# Patient Record
Sex: MALE | Race: WHITE | NOT HISPANIC OR LATINO | Employment: OTHER | URBAN - METROPOLITAN AREA
[De-identification: names, ages, dates, MRNs, and addresses within clinical notes are randomized per-mention and may not be internally consistent; named-entity substitution may affect disease eponyms.]

---

## 2017-11-16 DIAGNOSIS — I50.9 CONGESTIVE HEART FAILURE, UNSPECIFIED CONGESTIVE HEART FAILURE CHRONICITY, UNSPECIFIED CONGESTIVE HEART FAILURE TYPE: Primary | ICD-10-CM

## 2017-11-21 ENCOUNTER — OFFICE VISIT (OUTPATIENT)
Dept: CARDIOLOGY | Facility: CLINIC | Age: 37
End: 2017-11-21
Payer: MEDICAID

## 2017-11-21 ENCOUNTER — HOSPITAL ENCOUNTER (OUTPATIENT)
Dept: CARDIOLOGY | Facility: CLINIC | Age: 37
Discharge: HOME | End: 2017-11-21
Payer: MEDICAID

## 2017-11-21 VITALS
DIASTOLIC BLOOD PRESSURE: 76 MMHG | WEIGHT: 231.69 LBS | HEIGHT: 67 IN | SYSTOLIC BLOOD PRESSURE: 115 MMHG | HEART RATE: 54 BPM | BODY MASS INDEX: 36.36 KG/M2

## 2017-11-21 DIAGNOSIS — I50.9 CONGESTIVE HEART FAILURE, UNSPECIFIED CONGESTIVE HEART FAILURE CHRONICITY, UNSPECIFIED CONGESTIVE HEART FAILURE TYPE: ICD-10-CM

## 2017-11-21 DIAGNOSIS — I10 ESSENTIAL HYPERTENSION: ICD-10-CM

## 2017-11-21 DIAGNOSIS — I50.20 SYSTOLIC HEART FAILURE, UNSPECIFIED HEART FAILURE CHRONICITY: ICD-10-CM

## 2017-11-21 PROCEDURE — 99999 PR PBB SHADOW E&M-EST. PATIENT-LVL III: CPT | Mod: PBBFAC,,, | Performed by: INTERNAL MEDICINE

## 2017-11-21 PROCEDURE — 93010 ELECTROCARDIOGRAM REPORT: CPT | Mod: S$PBB,,, | Performed by: INTERNAL MEDICINE

## 2017-11-21 PROCEDURE — 99213 OFFICE O/P EST LOW 20 MIN: CPT | Mod: PBBFAC | Performed by: INTERNAL MEDICINE

## 2017-11-21 PROCEDURE — 99204 OFFICE O/P NEW MOD 45 MIN: CPT | Mod: S$PBB,,, | Performed by: INTERNAL MEDICINE

## 2017-11-21 PROCEDURE — 93005 ELECTROCARDIOGRAM TRACING: CPT | Mod: PBBFAC | Performed by: INTERNAL MEDICINE

## 2017-11-21 RX ORDER — CARVEDILOL 12.5 MG/1
12.5 TABLET ORAL DAILY
COMMUNITY
Start: 2016-11-22

## 2017-11-21 RX ORDER — PANTOPRAZOLE SODIUM 40 MG/1
40 TABLET, DELAYED RELEASE ORAL DAILY
COMMUNITY

## 2017-11-21 RX ORDER — LISINOPRIL AND HYDROCHLOROTHIAZIDE 12.5; 2 MG/1; MG/1
2 TABLET ORAL DAILY
COMMUNITY
Start: 2016-05-26

## 2017-11-21 RX ORDER — BUPROPION HYDROCHLORIDE 300 MG/1
300 TABLET ORAL DAILY
COMMUNITY

## 2017-11-21 NOTE — LETTER
November 21, 2017      Siri Simmons MD  [address] [facility] [place] [address]           [facility]  [address]  [place] [address]  Phone: [phone]          Patient: Sanjuanita Nuñez   MR Number: [id number]   YOB: 1980   Date of Visit: 11/21/2017       Dear Dr. Siri Simmons:    Thank you for referring Sanjuanita Nuñez to me for evaluation. Attached you will find relevant portions of my assessment and plan of care.    If you have questions, please do not hesitate to call me. I look forward to following Sanjuanita Nuñez along with you.    Sincerely,    Gianluca Gonzalez MD    Enclosure  CC:  No Recipients    If you would like to receive this communication electronically, please contact [email or web address] or [phone] to request more information on [employer] Link access.    For providers and/or their staff who would like to refer a patient to [facility], please contact us through our one-stop-shop provider referral line, [facility] , at [phone].    If you feel you have received this communication in error or would no longer like to receive these types of communications, please e-mail [email or web address]

## 2017-11-21 NOTE — ASSESSMENT & PLAN NOTE
Unknown how well controlled his BP is at home    Home BP monitoring as instructed (tid for 5 days) and bring record at the time of next appointment

## 2017-11-21 NOTE — PROGRESS NOTES
Patient ID:  Sanjuanita Nuñez is a 37 y.o. male who presents for evaluation of cardiomyopathy.    Pt is a ,  presenty on disability. He refers that in January 2016 he experienced exertional dyspnea and was diagnosed with cardiomyopathy. Coronary angiography showed normal coronaries. No cause for the cardiomyopathy has been identified. His symptoms have worsened significantly since January 2016: presently he gets short of breath by walking on a flat surface for a few hundred yards, e.g. from the garage to our Cardiology Clinic. He denies orthopnea, PND and leg edema. No symptoms suggestive of arrhythmias. No chest pain. His BP at home varies significantly ranging from 150/100 to 110/60 mmHg.    It is noteworthy that he was diagnosed with hypertension 4-5 year ago. His cholesterol was not found to be elevated. There is no family hx of heart disease. He has three daughters in good health. He has had kidney stones.  It is noteworthy that he drinks 16-18 bears during the weekend.      Present medical therapy:  Carvedilol 12.5 mg qd  Lisinopril-HCTZ 40-25 mg qd      No results found for: NA, K, CL, CO2, BUN, CREATININE, GLU, HGBA1C, MG, AST, ALT, ALBUMIN, PROT, BILITOT, WBC, HGB, HCT, MCV, PLT, INR, PSA, TSH      No results found for: CHOL, HDL, TRIG    No results found for: LDLCALC    No past medical history on file.        Review of Systems   Constitution: Negative for decreased appetite, diaphoresis, fever, weakness, malaise/fatigue, weight gain and weight loss.   HENT: Negative for congestion, ear discharge, ear pain and nosebleeds.    Eyes: Negative for blurred vision, double vision and visual disturbance.   Cardiovascular: Positive for dyspnea on exertion. Negative for chest pain, claudication, cyanosis, irregular heartbeat, leg swelling, near-syncope, orthopnea, palpitations, paroxysmal nocturnal dyspnea and syncope.   Respiratory: Negative for cough, hemoptysis, shortness of breath, sleep disturbances  "due to breathing, snoring, sputum production and wheezing.    Endocrine: Negative for polydipsia, polyphagia and polyuria.   Hematologic/Lymphatic: Negative for adenopathy and bleeding problem. Does not bruise/bleed easily.   Skin: Negative for color change, nail changes, poor wound healing and rash.   Musculoskeletal: Negative for muscle cramps and muscle weakness.   Gastrointestinal: Negative for abdominal pain, anorexia, change in bowel habit, hematochezia, nausea and vomiting.   Genitourinary: Negative for dysuria, frequency and hematuria.   Neurological: Negative for brief paralysis, difficulty with concentration, excessive daytime sleepiness, dizziness, focal weakness, headaches, light-headedness, seizures and vertigo.   Psychiatric/Behavioral: Negative for altered mental status and depression.   Allergic/Immunologic: Negative for persistent infections.                Objective:         /76 (BP Location: Left arm, Patient Position: Sitting, BP Method: X-Large (Automatic))   Pulse (!) 54   Ht 5' 7" (1.702 m)   Wt 105.1 kg (231 lb 11.3 oz)   BMI 36.29 kg/m²    Physical Exam   Constitutional: He is oriented to person, place, and time. He appears well-developed and well-nourished.   HENT:   Head: Normocephalic.   Right Ear: External ear normal.   Left Ear: External ear normal.   Nose: Nose normal.   Inspection of lips, teeth and gums normal   Eyes: EOM are normal. Pupils are equal, round, and reactive to light. No scleral icterus.   Neck: Normal range of motion. Neck supple. No JVD present. No tracheal deviation present. No thyromegaly present.   Cardiovascular: Normal rate, regular rhythm and intact distal pulses.  Exam reveals no gallop and no friction rub.    No murmur heard.  Pulses:       Carotid pulses are 2+ on the right side, and 2+ on the left side.       Radial pulses are 2+ on the right side, and 2+ on the left side.        Popliteal pulses are 2+ on the right side, and 2+ on the left side.    "     Dorsalis pedis pulses are 2+ on the right side, and 2+ on the left side.        Posterior tibial pulses are 2+ on the right side, and 2+ on the left side.   Pulmonary/Chest: Effort normal and breath sounds normal.   Abdominal: Bowel sounds are normal. He exhibits no distension. There is no hepatosplenomegaly. There is no tenderness. There is no guarding.   Musculoskeletal: Normal range of motion. He exhibits no edema or tenderness.   Lymphadenopathy:   Palpation of neck and groin lymph nodes normal   Neurological: He is alert and oriented to person, place, and time. No cranial nerve deficit. He exhibits normal muscle tone. Coordination normal.   Skin: Skin is dry.   No ankle nor pretibial edema   Psychiatric: His behavior is normal. Judgment and thought content normal.           ECG (11/21/2017 - informal reading)  Sinus bradycardia at 54/min  Non-specific ST-T wave changes      I have reviewed the following:     Details / Date    []   Labs     []   Imaging     []   Cardiology Procedures     []   Other      Assessment and Plan:       1. Systolic heart failure, unspecified heart failure chronicity    2. Essential hypertension         HFrEF (heart failure with reduced ejection fraction)  Non-ischemic cardiomyopathy of unknown etiology.    Echocardiogram  BNP  CMP  CBC  Lipid panel   RTC in 2 weeks    Essential hypertension  Unknown how well controlled his BP is at home    Home BP monitoring as instructed (tid for 5 days) and bring record at the time of next appointment

## 2017-11-21 NOTE — ASSESSMENT & PLAN NOTE
Non-ischemic cardiomyopathy of unknown etiology.    Echocardiogram  BNP  CMP  CBC  Lipid panel   RTC in 2 weeks

## 2018-01-09 ENCOUNTER — PATIENT MESSAGE (OUTPATIENT)
Dept: CARDIOLOGY | Facility: CLINIC | Age: 38
End: 2018-01-09

## 2018-01-18 NOTE — PROGRESS NOTES
" Patient ID:  Sanjuanita Nuñez is a 37 y.o. male who presents for follow-up of cardiomyopathy.     Pt refers that since last visit with me on 11/21/2017 his exercise tolerance has not improved and he still gets short of breath with minimal exercise; no difference.    Further, he complains of episodes of chest pain that have been present since January 2016. These episodes (not mentioned at the time of last visit) occur 3-10 times per week and their frequency has somewhat increased since they started. The pain is retrosternal and occasionally radiates to the left jaw; he describes the pain as "pressure" or "sharp", intensity 6-7/10. The chest pain can occur when he performs physical activity, although not reproducibly, and also at rest. The chest pain is associated with shortness of breath and nausea.    He still drinks 10 beers during the weekend (although I advised him to quit drinking). Usually he does not drink during the week.    In the past he has been treated with atorvastatin but has developed muscle cramps in response to this drug and discontinued it.    It is noteworthy that he was diagnosed with hypertension 4-5 year ago. His cholesterol was not found to be elevated. There is no family hx of heart disease. He has three daughters in good health. He has had kidney stones. It is noteworthy that he drinks 16-18 bears during the weekend.       Present medical therapy:  Carvedilol 12.5 mg qd  Lisinopril-HCTZ 40-25 mg qd    BNP is normal          Relevant information from my 11/21/2017 note     'Pt is a ,  presenty on disability. He refers that in January 2016 he experienced exertional dyspnea and was diagnosed with cardiomyopathy. Coronary angiography showed normal coronaries. No cause for the cardiomyopathy has been identified. His symptoms have worsened significantly since January 2016: presently he gets short of breath by walking on a flat surface for a few hundred yards, e.g. from the garage to our " "Cardiology Clinic. He denies orthopnea, PND and leg edema. No symptoms suggestive of arrhythmias. No chest pain. His BP at home varies significantly ranging from 150/100 to 110/60 mmHg".         Lab Results   Component Value Date     01/19/2018    K 3.8 01/19/2018     01/19/2018    CO2 30 (H) 01/19/2018    BUN 13 01/19/2018    CREATININE 1.1 01/19/2018     01/19/2018    AST 23 01/19/2018    ALT 58 (H) 01/19/2018    ALBUMIN 3.6 01/19/2018    PROT 7.1 01/19/2018    BILITOT 0.4 01/19/2018    WBC 7.30 01/19/2018    HGB 16.8 01/19/2018    HCT 48.2 01/19/2018    MCV 88 01/19/2018     01/19/2018       History reviewed. No pertinent past medical history.  Family History   Problem Relation Age of Onset    Heart attack Maternal Grandfather     Heart attack Paternal Grandfather     Hypertension Neg Hx     Hyperlipidemia Neg Hx      Social History     Social History    Marital status:      Spouse name: N/A    Number of children: N/A    Years of education: N/A     Occupational History    Not on file.     Social History Main Topics    Smoking status: Never Smoker    Smokeless tobacco: Never Used    Alcohol use Yes      Comment: socially    Drug use: Unknown    Sexual activity: Not on file     Other Topics Concern    Not on file     Social History Narrative    No narrative on file       Lab Results   Component Value Date    CHOL 255 (H) 01/19/2018    HDL 30 (L) 01/19/2018    TRIG 202 (H) 01/19/2018       Lab Results   Component Value Date    LDLCALC 184.6 (H) 01/19/2018       History reviewed. No pertinent past medical history.  Hypertension Medications             carvedilol (COREG) 12.5 MG tablet Take 12.5 mg by mouth once daily.     lisinopril-hydrochlorothiazide (PRINZIDE,ZESTORETIC) 20-12.5 mg per tablet Take 2 tablets by mouth once daily.             Review of Systems   Constitution: Negative for decreased appetite, diaphoresis, fever, weakness, malaise/fatigue, weight gain and " "weight loss.   HENT: Negative for congestion, ear discharge, ear pain and nosebleeds.    Eyes: Negative for blurred vision, double vision and visual disturbance.   Cardiovascular: Positive for dyspnea on exertion. Negative for chest pain, claudication, cyanosis, irregular heartbeat, leg swelling, near-syncope, orthopnea, palpitations, paroxysmal nocturnal dyspnea and syncope.   Respiratory: Negative for cough, hemoptysis, shortness of breath, sleep disturbances due to breathing, snoring, sputum production and wheezing.    Endocrine: Negative for polydipsia, polyphagia and polyuria.   Hematologic/Lymphatic: Negative for adenopathy and bleeding problem. Does not bruise/bleed easily.   Skin: Negative for color change, nail changes, poor wound healing and rash.   Musculoskeletal: Negative for muscle cramps and muscle weakness.   Gastrointestinal: Negative for abdominal pain, anorexia, change in bowel habit, hematochezia, nausea and vomiting.   Genitourinary: Negative for dysuria, frequency and hematuria.   Neurological: Negative for brief paralysis, difficulty with concentration, excessive daytime sleepiness, dizziness, focal weakness, headaches, light-headedness, seizures and vertigo.   Psychiatric/Behavioral: Negative for altered mental status and depression.   Allergic/Immunologic: Negative for persistent infections.                Objective:         /76 (BP Location: Left arm, Patient Position: Sitting, BP Method: Large (Automatic))   Pulse 92   Ht 5' 7" (1.702 m)   Wt 108.3 kg (238 lb 12.1 oz)   SpO2 96%   BMI 37.39 kg/m²    Physical Exam   Constitutional: He is oriented to person, place, and time. He appears well-developed and well-nourished.   HENT:   Head: Normocephalic.   Right Ear: External ear normal.   Left Ear: External ear normal.   Nose: Nose normal.   Inspection of lips, teeth and gums normal   Eyes: EOM are normal. Pupils are equal, round, and reactive to light. No scleral icterus.   Neck: " Normal range of motion. Neck supple. No JVD present. No tracheal deviation present. No thyromegaly present.   Cardiovascular: Normal rate, regular rhythm, S1 normal, S2 normal and intact distal pulses.  Exam reveals no gallop and no friction rub.    No murmur heard.  Pulmonary/Chest: Effort normal and breath sounds normal.   Abdominal: Bowel sounds are normal. He exhibits no distension. There is no hepatosplenomegaly. There is no tenderness. There is no guarding.   Musculoskeletal: Normal range of motion. He exhibits no edema or tenderness.   Lymphadenopathy:   Palpation of neck and groin lymph nodes normal   Neurological: He is alert and oriented to person, place, and time. No cranial nerve deficit. He exhibits normal muscle tone. Coordination normal.   Skin: Skin is dry.   Palpation of skin normal   Psychiatric: His behavior is normal. Judgment and thought content normal.             ECG (11/21/2017 - informal reading)  Sinus bradycardia at 54/min  Non-specific ST-T wave changes          Echocardiogram (01/19/2018)    TEST DESCRIPTION   Technical Quality: This is a technically adequate study. This study was performed in conjunction with a 3ml intravenous injection of Optison contrast agent.     Aorta: The aortic root is normal in size, measuring 3.1 cm at sinotubular junction and 3.5 cm at Sinuses of Valsalva. The proximal ascending aorta is normal in size, measuring 3.4 cm across.     Left Atrium: The left atrial volume index is normal, measuring 22.85 cc/m2.     Left Ventricle: The left ventricle is normal in size, with an end-diastolic diameter of 3.7 cm, and an end-systolic diameter of 2.5 cm. LV wall thickness is normal, with the septum measuring 0.7 cm and the posterior wall measuring 0.8 cm across. Relative  wall thickness was normal at 0.43, and the LV mass index was 37.2 g/m2 consistent with normal left ventricular mass. There are no regional wall motion abnormalities. Left ventricular systolic function  appears normal. Visually estimated ejection fraction   is 55-60%. The LV Doppler derived stroke volume equals 60.0 ccs.     Diastolic indices: E wave velocity 0.5 m/s, E/A ratio 0.8,  msec., E/e' ratio(avg) 8. Diastolic function is normal.     Right Atrium: The right atrium is normal in size, measuring 4.6 cm in length and 3.3 cm in width in the apical view.     Right Ventricle: The right ventricle is normal in size measuring 4.0 cm at the base in the apical right ventricle-focused view. Global right ventricular systolic function appears normal. Tricuspid annular plane systolic excursion (TAPSE) is 2.2 cm. The estimated PA systolic pressure is 18 mmHg.     Aortic Valve:  Aortic valve is normal in structure with normal leaflet mobility.     Mitral Valve:  Mitral valve is normal in structure with normal leaflet mobility.     Tricuspid Valve:  Tricuspid valve is normal in structure with normal leaflet mobility. There is trivial to mild tricuspid regurgitation.     Pulmonary Valve:  Pulmonary valve is normal in structure with normal leaflet mobility.     IVC: IVC is normal in size and collapses > 50% with a sniff, suggesting normal right atrial pressure of 3 mmHg.     Intracavitary: There is no evidence of pericardial effusion, intracavity mass, thrombi, or vegetation.     This document has been electronically    SIGNED BY: Griselda Dale MD        I have reviewed the following:     Details / Date    []   Labs     []   Imaging     []   Cardiology Procedures     []   Other      Assessment and Plan:       1. Chronic systolic heart failure    2. Hypercholesterolemia    3. Essential hypertension    4. Chest pain of unknown etiology         HFrEF (heart failure with reduced ejection fraction)  Normal physical exam; normal echocardiogram; normal BNP; ECG shows sinus bradycardia (pt on beta blocker). There is no objective evidence to support the diagnosis of cardiomyopathy and to explain the markedly diminished  exercise tolerance.    I have explained to the pt the importance of reviewing all his prior medical records including echocardiograms, coronary angiography and stress tests.     I have reemphasized the importance to quit drinking alcohol.  Continue present therapy:  Present medical therapy:  Carvedilol 12.5 mg qd  Lisinopril-HCTZ 40-25 mg qd    Cardiopulmonary testing to be ordered after reviewing old medical records    RTC in 3 weeks    Chest pain of unknown etiology  Symptoms are aty[ical for angina    Treadmill nuclear stress test    Essential hypertension  BP is well controlled    Hypercholesterolemia  .6  Triglycerides 201    Atorvastatin 20 mg qd  Evaluate side effects (muscle cramps) at the time of next appointment

## 2018-01-19 ENCOUNTER — OFFICE VISIT (OUTPATIENT)
Dept: CARDIOLOGY | Facility: CLINIC | Age: 38
End: 2018-01-19
Payer: MEDICAID

## 2018-01-19 ENCOUNTER — HOSPITAL ENCOUNTER (OUTPATIENT)
Dept: CARDIOLOGY | Facility: CLINIC | Age: 38
Discharge: HOME | End: 2018-01-19
Attending: INTERNAL MEDICINE
Payer: MEDICAID

## 2018-01-19 VITALS
DIASTOLIC BLOOD PRESSURE: 76 MMHG | OXYGEN SATURATION: 96 % | BODY MASS INDEX: 37.47 KG/M2 | SYSTOLIC BLOOD PRESSURE: 118 MMHG | HEART RATE: 92 BPM | HEIGHT: 67 IN | WEIGHT: 238.75 LBS

## 2018-01-19 DIAGNOSIS — I50.20 SYSTOLIC HEART FAILURE, UNSPECIFIED HEART FAILURE CHRONICITY: ICD-10-CM

## 2018-01-19 DIAGNOSIS — I10 ESSENTIAL HYPERTENSION: ICD-10-CM

## 2018-01-19 DIAGNOSIS — E78.00 HYPERCHOLESTEROLEMIA: ICD-10-CM

## 2018-01-19 DIAGNOSIS — R07.9 CHEST PAIN OF UNKNOWN ETIOLOGY: ICD-10-CM

## 2018-01-19 DIAGNOSIS — I50.22 CHRONIC SYSTOLIC HEART FAILURE: Primary | ICD-10-CM

## 2018-01-19 LAB
DIASTOLIC DYSFUNCTION: NO
ESTIMATED PA SYSTOLIC PRESSURE: 18.21
RETIRED EF AND QEF - SEE NOTES: 55 (ref 55–65)
TRICUSPID VALVE REGURGITATION: NORMAL

## 2018-01-19 PROCEDURE — C8929 TTE W OR WO FOL WCON,DOPPLER: HCPCS | Mod: PBBFAC | Performed by: INTERNAL MEDICINE

## 2018-01-19 PROCEDURE — 93306 TTE W/DOPPLER COMPLETE: CPT | Mod: 26,S$PBB,, | Performed by: INTERNAL MEDICINE

## 2018-01-19 PROCEDURE — 99999 PR PBB SHADOW E&M-EST. PATIENT-LVL IV: CPT | Mod: PBBFAC,,, | Performed by: INTERNAL MEDICINE

## 2018-01-19 PROCEDURE — 99214 OFFICE O/P EST MOD 30 MIN: CPT | Mod: PBBFAC,25 | Performed by: INTERNAL MEDICINE

## 2018-01-19 PROCEDURE — 99213 OFFICE O/P EST LOW 20 MIN: CPT | Mod: S$PBB,,, | Performed by: INTERNAL MEDICINE

## 2018-01-19 RX ORDER — ATORVASTATIN CALCIUM 20 MG/1
20 TABLET, FILM COATED ORAL DAILY
Qty: 90 TABLET | Refills: 3 | Status: SHIPPED | OUTPATIENT
Start: 2018-01-19 | End: 2019-01-19

## 2018-01-19 NOTE — ASSESSMENT & PLAN NOTE
.6  Triglycerides 201    Atorvastatin 20 mg qd  Evaluate side effects (muscle cramps) at the time of next appointment

## 2018-01-19 NOTE — ASSESSMENT & PLAN NOTE
Normal physical exam; normal echocardiogram; normal BNP; ECG shows sinus bradycardia (pt on beta blocker). There is no objective evidence to support the diagnosis of cardiomyopathy and to explain the markedly diminished exercise tolerance.    I have explained to the pt the importance of reviewing all his prior medical records including echocardiograms, coronary angiography and stress tests.     I have reemphasized the importance to quit drinking alcohol.  Continue present therapy:  Present medical therapy:  Carvedilol 12.5 mg qd  Lisinopril-HCTZ 40-25 mg qd    Cardiopulmonary testing to be ordered after reviewing old medical records    RTC in 3 weeks

## 2018-01-19 NOTE — PROGRESS NOTES
Consent obtained. 22 g sl started in right forearm for optison use. optison given ivp via sl for imaging.  Denies transfusion rxn. Tolerated well. Sl d/cristian after . Pressure applied.

## 2018-01-29 ENCOUNTER — PATIENT MESSAGE (OUTPATIENT)
Dept: CARDIOLOGY | Facility: CLINIC | Age: 38
End: 2018-01-29

## 2018-01-30 ENCOUNTER — PATIENT MESSAGE (OUTPATIENT)
Dept: CARDIOLOGY | Facility: CLINIC | Age: 38
End: 2018-01-30

## 2018-02-01 ENCOUNTER — TELEPHONE (OUTPATIENT)
Dept: CARDIOLOGY | Facility: CLINIC | Age: 38
End: 2018-02-01

## 2018-02-02 ENCOUNTER — TELEPHONE (OUTPATIENT)
Dept: CARDIOLOGY | Facility: CLINIC | Age: 38
End: 2018-02-02

## 2018-02-02 ENCOUNTER — CLINICAL SUPPORT (OUTPATIENT)
Dept: CARDIOLOGY | Facility: CLINIC | Age: 38
End: 2018-02-02
Attending: INTERNAL MEDICINE
Payer: MEDICAID

## 2018-02-02 LAB — DIASTOLIC DYSFUNCTION: NO

## 2018-02-02 PROCEDURE — 93016 CV STRESS TEST SUPVJ ONLY: CPT | Mod: S$PBB,,, | Performed by: INTERNAL MEDICINE

## 2018-02-02 PROCEDURE — 93018 CV STRESS TEST I&R ONLY: CPT | Mod: S$PBB,,, | Performed by: INTERNAL MEDICINE

## 2018-02-02 PROCEDURE — 78452 HT MUSCLE IMAGE SPECT MULT: CPT | Mod: PBBFAC | Performed by: INTERNAL MEDICINE

## 2018-02-03 NOTE — TELEPHONE ENCOUNTER
----- Message from Gianluca Gonzalez MD sent at 2/2/2018  3:49 PM CST -----  Please let pt know that stress test showed no evidence of ischemia.    jail

## 2018-02-03 NOTE — TELEPHONE ENCOUNTER
Results of nuclear stress test were given to patient.    MD Hoa Yanez MA   Caller: Unspecified (Today,  3:49 PM)             Please let pt know that stress test showed no evidence of ischemia.     [facility]

## 2018-02-07 ENCOUNTER — PATIENT MESSAGE (OUTPATIENT)
Dept: CARDIOLOGY | Facility: CLINIC | Age: 38
End: 2018-02-07